# Patient Record
Sex: FEMALE | Race: WHITE | NOT HISPANIC OR LATINO | Employment: PART TIME | URBAN - METROPOLITAN AREA
[De-identification: names, ages, dates, MRNs, and addresses within clinical notes are randomized per-mention and may not be internally consistent; named-entity substitution may affect disease eponyms.]

---

## 2017-04-02 ENCOUNTER — OFFICE VISIT (OUTPATIENT)
Dept: FAMILY MEDICINE | Facility: CLINIC | Age: 52
End: 2017-04-02
Payer: COMMERCIAL

## 2017-04-02 VITALS
SYSTOLIC BLOOD PRESSURE: 143 MMHG | DIASTOLIC BLOOD PRESSURE: 80 MMHG | TEMPERATURE: 98 F | OXYGEN SATURATION: 98 % | WEIGHT: 103.31 LBS | BODY MASS INDEX: 17.64 KG/M2 | HEART RATE: 65 BPM | HEIGHT: 64 IN

## 2017-04-02 DIAGNOSIS — J30.9 ALLERGIC RHINITIS, UNSPECIFIED ALLERGIC RHINITIS TRIGGER, UNSPECIFIED RHINITIS SEASONALITY: ICD-10-CM

## 2017-04-02 DIAGNOSIS — H66.91 RIGHT OTITIS MEDIA, UNSPECIFIED CHRONICITY, UNSPECIFIED OTITIS MEDIA TYPE: Primary | ICD-10-CM

## 2017-04-02 PROCEDURE — 99999 PR PBB SHADOW E&M-EST. PATIENT-LVL III: CPT | Mod: PBBFAC,,,

## 2017-04-02 PROCEDURE — 1160F RVW MEDS BY RX/DR IN RCRD: CPT | Mod: S$GLB,,, | Performed by: NURSE PRACTITIONER

## 2017-04-02 PROCEDURE — 99214 OFFICE O/P EST MOD 30 MIN: CPT | Mod: S$GLB,,, | Performed by: NURSE PRACTITIONER

## 2017-04-02 RX ORDER — FLUTICASONE PROPIONATE 50 MCG
2 SPRAY, SUSPENSION (ML) NASAL DAILY
Qty: 1 BOTTLE | Refills: 2 | Status: SHIPPED | OUTPATIENT
Start: 2017-04-02 | End: 2017-05-02

## 2017-04-02 RX ORDER — AMOXICILLIN 500 MG/1
500 CAPSULE ORAL EVERY 12 HOURS
Qty: 14 CAPSULE | Refills: 0 | Status: SHIPPED | OUTPATIENT
Start: 2017-04-02 | End: 2017-04-12

## 2017-04-02 RX ORDER — CETIRIZINE HYDROCHLORIDE 10 MG/1
10 TABLET ORAL DAILY
Refills: 0 | COMMUNITY
Start: 2017-04-02 | End: 2017-04-02 | Stop reason: SDUPTHER

## 2017-04-02 RX ORDER — CETIRIZINE HYDROCHLORIDE 10 MG/1
10 TABLET ORAL DAILY
Qty: 30 TABLET | Refills: 0 | Status: SHIPPED | OUTPATIENT
Start: 2017-04-02 | End: 2018-02-05

## 2017-04-02 NOTE — MR AVS SNAPSHOT
[facility]  [address]  [place] [address]  Phone: [phone]  Fax: [phone]                  Floridalma Campa   2017 3:30 PM   Office Visit    Description:  Female : 1965   Provider:  LAPALCO, WALK IN   Department:  [facility]           Reason for Visit     Otalgia     Sore Throat           Diagnoses this Visit        Comments    Right otitis media, unspecified chronicity, unspecified otitis media type    -  Primary     Allergic rhinitis, unspecified allergic rhinitis trigger, unspecified rhinitis seasonality                To Do List           Goals (5 Years of Data)     None       These Medications        Disp Refills Start End    fluticasone (FLONASE) 50 mcg/actuation nasal spray 1 Bottle 2 2017    2 sprays by Each Nare route once daily. - Each Nare    Pharmacy: [facility] [id number] [place] [address] Ph #: [phone]       amoxicillin (AMOXIL) 500 MG capsule 14 capsule 0 2017    Take 1 capsule (500 mg total) by mouth every 12 (twelve) hours. - Oral    Pharmacy: [facility] [id number] [place] [address] Ph #: [phone]         PURCHASE these Medications (No prescription required)        Start End    cetirizine (ZYRTEC) 10 MG tablet 2017    Sig - Route: Take 1 tablet (10 mg total) by mouth once daily. - Oral    Class: OTC      [facility]     [facility] Nurse Care Line - 24/ Assistance  Unless otherwise directed by your provider, please contact [facility], our nurse care line that is available for 24/7 assistance.     Registered nurses in the [facility] Center provide: appointment scheduling, clinical advisement, health education, and other advisory services.  Call: [phone] (toll free)               Medications           Message regarding Medications     Verify the changes and/or additions to your medication regime listed below are the same as  "discussed with your clinician today.  If any of these changes or additions are incorrect, please notify your healthcare provider.        START taking these NEW medications        Refills    fluticasone (FLONASE) 50 mcg/actuation nasal spray 2    Si sprays by Each Nare route once daily.    Class: Normal    Route: Each Nare    cetirizine (ZYRTEC) 10 MG tablet 0    Sig: Take 1 tablet (10 mg total) by mouth once daily.    Class: OTC    Route: Oral    amoxicillin (AMOXIL) 500 MG capsule 0    Sig: Take 1 capsule (500 mg total) by mouth every 12 (twelve) hours.    Class: Normal    Route: Oral      STOP taking these medications     ibuprofen (ADVIL,MOTRIN) 800 MG tablet     naproxen (NAPROSYN) 500 MG tablet Take 1 tablet (500 mg total) by mouth 2 (two) times daily.           Verify that the below list of medications is an accurate representation of the medications you are currently taking.  If none reported, the list may be blank. If incorrect, please contact your healthcare provider. Carry this list with you in case of emergency.           Current Medications     spironolactone (ALDACTONE) 25 MG tablet Take 25 mg by mouth once daily.    spironolactone (ALDACTONE) 50 MG tablet 50 mg.    amoxicillin (AMOXIL) 500 MG capsule Take 1 capsule (500 mg total) by mouth every 12 (twelve) hours.    cetirizine (ZYRTEC) 10 MG tablet Take 1 tablet (10 mg total) by mouth once daily.    fluticasone (FLONASE) 50 mcg/actuation nasal spray 2 sprays by Each Nare route once daily.           Clinical Reference Information           Your Vitals Were     BP Pulse Temp Height Weight SpO2    143/80 (BP Location: Right arm, Patient Position: Sitting) 65 97.9 °F (36.6 °C) (Oral) 5' 4" (1.626 m) 46.9 kg (103 lb 4.6 oz) 98%    BMI                17.73 kg/m2          Blood Pressure          Most Recent Value    BP  (!)  143/80      Allergies as of 2017     Sulfa (Sulfonamide Antibiotics)      Immunizations Administered on Date of Encounter - " 4/2/2017     None      [facility] Sign-Up     Activating your [facility] account is as easy as 1-2-3!     1) Visit [email or web address], select Sign Up Now, enter this activation code and your date of birth, then select Next.  [email or web address]  Expires: 5/17/2017  3:53 PM      2) Create a username and password to use when you visit [facility] in the future and select a security question in case you lose your password and select Next.    3) Enter your e-mail address and click Sign Up!    Additional Information  If you have questions, please e-mail [email or web address] or call [phone] to talk to our [facility] staff. Remember, [facility] is NOT to be used for urgent needs. For medical emergencies, dial 911.         Instructions    Follow up if not improved  Go to ER for new worse or concerning symptoms  Drink plenty of fluids  Tylenol or ibuprofen as needed for fever or pain    Understanding Nasal Allergies  Nasal allergies (also called allergic rhinitis) are a common health problem. They may be seasonal. This means they cause symptoms only at certain times of the year. Or they may be perennial. This means they cause symptoms all year long. Other health problems, such as asthma, often occur along with allergies as well.    What is an allergic reaction?  An allergy is a reaction to a substance called an allergen. Common allergens include:  · Wind-borne pollen  · Mold  · Dust mites  · Furry and feathered animals  · Cockroaches  Normally, allergens are harmless. But when a person has allergies, the body thinks they are harmful. The body then attacks allergens with antibodies. Antibodies are attached to special cells called mast cells. Allergens stick to the antibodies. This makes the mast cells release histamine and other chemicals. This is an allergic reaction. The chemicals irritate nearby nasal tissue. This causes nasal allergy symptoms.  Common nasal allergy symptoms  Allergies can cause nasal tissue to swell. This makes  the air passages smaller. The nose may feel stuffed up. The nose may also make extra mucus, which can plug the nasal passages or drip out of the nose. Mucus can drip down the back of the throat (postnasal drip) as well. Sinus tissue can swell. This may cause pain and headache. Common allergy symptoms include:  · Runny nose with clear, watery discharge  · Stuffy nose (nasal congestion)  · Drainage down your throat (postnasal drip)  · Sneezing  · Red, watery eyes  · Itchy nose, eyes, ears, and throat  · Plugged-up ears (ear congestion)  · Sore throat  · Coughing  · Sinus pain and swelling  · Headache  It may not be allergies  Other health problems can cause symptoms like those of nasal allergies. These include:  · Nonallergic rhinitis and viruses such as colds  · Irritants and pollutants, such as strong odors or smoke  · Certain medicines  · Changes in the weather   Treatment  Your healthcare provider will evaluate you to find the cause of your symptoms then recommend treatment. If your symptoms are due to nasal allergies, your healthcare provider may prescribe nasal steroid sprays or oral antihistamines to help reduce symptoms. Avoidance of the allergen will also be suggested. You may also be referred to an allergist.   Date Last Reviewed: 10/1/2016  © 2793-5733 [employer]. [address] [place] [address]. All rights reserved. This information is not intended as a substitute for professional medical care. Always follow your healthcare professional's instructions.             Language Assistance Services     ATTENTION: Language assistance services are available, free of charge. Please call [phone].      ATENCIÓN: Si habla jason, tiene a curran disposición servicios gratuitos de asistencia lingüística. Llame al [phone].     [facility] Ý: N?u b?n nói Ti?ng Vi?t, có các d?ch v? h? tr? ngôn ng? mi?n phí dành cho b?n. G?i s? [phone].         [facility] complies with  applicable Federal civil rights laws and does not discriminate on the basis of race, color, national origin, age, disability, or sex.

## 2017-04-02 NOTE — PATIENT INSTRUCTIONS
Follow up if not improved  Go to ER for new worse or concerning symptoms  Drink plenty of fluids  Tylenol or ibuprofen as needed for fever or pain    Understanding Nasal Allergies  Nasal allergies (also called allergic rhinitis) are a common health problem. They may be seasonal. This means they cause symptoms only at certain times of the year. Or they may be perennial. This means they cause symptoms all year long. Other health problems, such as asthma, often occur along with allergies as well.    What is an allergic reaction?  An allergy is a reaction to a substance called an allergen. Common allergens include:  · Wind-borne pollen  · Mold  · Dust mites  · Furry and feathered animals  · Cockroaches  Normally, allergens are harmless. But when a person has allergies, the body thinks they are harmful. The body then attacks allergens with antibodies. Antibodies are attached to special cells called mast cells. Allergens stick to the antibodies. This makes the mast cells release histamine and other chemicals. This is an allergic reaction. The chemicals irritate nearby nasal tissue. This causes nasal allergy symptoms.  Common nasal allergy symptoms  Allergies can cause nasal tissue to swell. This makes the air passages smaller. The nose may feel stuffed up. The nose may also make extra mucus, which can plug the nasal passages or drip out of the nose. Mucus can drip down the back of the throat (postnasal drip) as well. Sinus tissue can swell. This may cause pain and headache. Common allergy symptoms include:  · Runny nose with clear, watery discharge  · Stuffy nose (nasal congestion)  · Drainage down your throat (postnasal drip)  · Sneezing  · Red, watery eyes  · Itchy nose, eyes, ears, and throat  · Plugged-up ears (ear congestion)  · Sore throat  · Coughing  · Sinus pain and swelling  · Headache  It may not be allergies  Other health problems can cause symptoms like those of nasal allergies. These include:  · Nonallergic  rhinitis and viruses such as colds  · Irritants and pollutants, such as strong odors or smoke  · Certain medicines  · Changes in the weather   Treatment  Your healthcare provider will evaluate you to find the cause of your symptoms then recommend treatment. If your symptoms are due to nasal allergies, your healthcare provider may prescribe nasal steroid sprays or oral antihistamines to help reduce symptoms. Avoidance of the allergen will also be suggested. You may also be referred to an allergist.   Date Last Reviewed: 10/1/2016  © 8622-0204 [employer]. [address] [place] [address], [place] [address]. All rights reserved. This information is not intended as a substitute for professional medical care. Always follow your healthcare professional's instructions.

## 2017-04-02 NOTE — PROGRESS NOTES
Upper Respiratory Infection  Patient complains of a 1month intermittent history of some URI complaints. Associated symptoms include R ear pain, throat pain, NP cough, no fever .  She has attempted cefdir tablets.  Sick contacts include .  She has had a flu shot this season.    Subjective:       Patient ID: Floridalma Campa is a 51 y.o. female.      Review of Systems   Constitutional: Negative for fever.   HENT: Positive for ear pain and sore throat.    Respiratory: Positive for cough.        Objective:      Physical Exam   Constitutional: She is oriented to person, place, and time. She appears well-developed and well-nourished. She does not appear ill. No distress.   HENT:   Right Ear: Tympanic membrane is injected. A middle ear effusion is present.   Left Ear: A middle ear effusion is present.   Mouth/Throat: Uvula is midline, oropharynx is clear and moist and mucous membranes are normal.   Cardiovascular: Normal rate, regular rhythm and normal heart sounds.  Exam reveals no friction rub.    No murmur heard.  Pulmonary/Chest: Effort normal and breath sounds normal. No respiratory distress. She has no decreased breath sounds. She has no wheezes. She has no rhonchi. She has no rales.   Musculoskeletal: Normal range of motion. She exhibits no edema.   Lymphadenopathy:        Head (right side): No submental, no submandibular and no tonsillar adenopathy present.        Head (left side): No submental, no submandibular and no tonsillar adenopathy present.     She has no cervical adenopathy.   Neurological: She is alert and oriented to person, place, and time.   Skin: Skin is warm and dry. No erythema.   Psychiatric: She has a normal mood and affect. Her behavior is normal.   Vitals reviewed.      Assessment:       1. Right otitis media, unspecified chronicity, unspecified otitis media type    2. Allergic rhinitis, unspecified allergic rhinitis trigger, unspecified rhinitis seasonality        Plan:       Right otitis  media, unspecified chronicity, unspecified otitis media type  -     amoxicillin (AMOXIL) 500 MG capsule; Take 1 capsule (500 mg total) by mouth every 12 (twelve) hours.  Dispense: 14 capsule; Refill: 0    Allergic rhinitis, unspecified allergic rhinitis trigger, unspecified rhinitis seasonality  -     fluticasone (FLONASE) 50 mcg/actuation nasal spray; 2 sprays by Each Nare route once daily.  Dispense: 1 Bottle; Refill: 2  -     cetirizine (ZYRTEC) 10 MG tablet; Take 1 tablet (10 mg total) by mouth once daily.  Dispense: 30 tablet; Refill: 0    Viral vs bacterial, will treat with abx given chronicity.    Follow up if not improved  Go to ER for new worse or concerning symptoms  Drink plenty of fluids  Tylenol or ibuprofen as needed for fever or pain    Verbalized understanding

## 2017-12-26 ENCOUNTER — OFFICE VISIT (OUTPATIENT)
Dept: FAMILY MEDICINE | Facility: CLINIC | Age: 52
End: 2017-12-26
Payer: COMMERCIAL

## 2017-12-26 VITALS
TEMPERATURE: 98 F | SYSTOLIC BLOOD PRESSURE: 120 MMHG | OXYGEN SATURATION: 98 % | HEIGHT: 64 IN | BODY MASS INDEX: 18.5 KG/M2 | WEIGHT: 108.38 LBS | HEART RATE: 78 BPM | DIASTOLIC BLOOD PRESSURE: 64 MMHG

## 2017-12-26 DIAGNOSIS — R09.82 POST-NASAL DRIP: ICD-10-CM

## 2017-12-26 DIAGNOSIS — R05.9 COUGH: ICD-10-CM

## 2017-12-26 DIAGNOSIS — J01.90 ACUTE RHINOSINUSITIS: Primary | ICD-10-CM

## 2017-12-26 PROCEDURE — 99214 OFFICE O/P EST MOD 30 MIN: CPT | Mod: S$GLB,,, | Performed by: NURSE PRACTITIONER

## 2017-12-26 PROCEDURE — 99999 PR PBB SHADOW E&M-EST. PATIENT-LVL IV: CPT | Mod: PBBFAC,,, | Performed by: NURSE PRACTITIONER

## 2017-12-26 RX ORDER — LEVOCETIRIZINE DIHYDROCHLORIDE 5 MG/1
5 TABLET, FILM COATED ORAL NIGHTLY
Qty: 30 TABLET | Refills: 0 | Status: SHIPPED | OUTPATIENT
Start: 2017-12-26 | End: 2018-02-05 | Stop reason: SDUPTHER

## 2017-12-26 RX ORDER — PROMETHAZINE HYDROCHLORIDE AND DEXTROMETHORPHAN HYDROBROMIDE 6.25; 15 MG/5ML; MG/5ML
5 SYRUP ORAL NIGHTLY
Qty: 180 ML | Refills: 0 | Status: SHIPPED | OUTPATIENT
Start: 2017-12-26 | End: 2018-01-05

## 2017-12-26 RX ORDER — BENZONATATE 100 MG/1
100 CAPSULE ORAL 3 TIMES DAILY PRN
Qty: 30 CAPSULE | Refills: 0 | Status: SHIPPED | OUTPATIENT
Start: 2017-12-26 | End: 2018-01-05

## 2017-12-26 RX ORDER — FLUTICASONE PROPIONATE 50 MCG
1 SPRAY, SUSPENSION (ML) NASAL DAILY
Qty: 1 BOTTLE | Refills: 0 | Status: SHIPPED | OUTPATIENT
Start: 2017-12-26

## 2017-12-26 NOTE — PROGRESS NOTES
"History of Present Illness   Floridalma Campa is a 52 y.o. woman with medical history as listed below who presents today for evaluation of URI symptoms for about four days. She complains of nasal congestion with post nasal drip, sore throat, and a dry cough. She also has bilateral ear pain. She has intermittent fevers with chills. She denies sinus pressure or headaches. She has tried Tylenol, Ibuprofen, and Zyrtec with no relief. She denies known sick contacts. She has no additional complaints and is otherwise healthy on today's visit.    Past Medical History:   Diagnosis Date    Bloating        Past Surgical History:   Procedure Laterality Date    COSMETIC SURGERY  1990,2005;2007       Social History     Social History    Marital status:      Spouse name: N/A    Number of children: N/A    Years of education: N/A     Social History Main Topics    Smoking status: Never Smoker    Smokeless tobacco: None    Alcohol use Yes      Comment: daily wine, and liquor    Drug use: No    Sexual activity: Not Asked     Other Topics Concern    None     Social History Narrative    None       Family History   Problem Relation Age of Onset    Hypertension Mother     Heart disease Mother     Hypertension Father     Heart disease Father        Review of Systems  Review of Systems   Constitutional: Positive for chills, fever and malaise/fatigue.   HENT: Positive for congestion, ear pain and sore throat. Negative for ear discharge and sinus pain.    Eyes: Negative for discharge and redness.   Respiratory: Positive for cough. Negative for sputum production, shortness of breath and wheezing.    Cardiovascular: Negative for chest pain.   Gastrointestinal: Negative for nausea and vomiting.     A complete review of systems was otherwise negative.    Physical Exam  /64 (BP Location: Right arm, Patient Position: Sitting, BP Method: Medium (Manual))   Pulse 78   Temp 97.9 °F (36.6 °C) (Oral)   Ht 5' 4" (1.626 m)   " Wt 49.2 kg (108 lb 5.7 oz)   SpO2 98%   BMI 18.60 kg/m²   General appearance: alert, appears stated age, cooperative and no distress  Eyes: negative findings: lids and lashes normal and conjunctivae and sclerae normal  Ears: normal TM's and external ear canals both ears and bilateral middle ear effusion  Nose: clear discharge, moderate congestion, turbinates red, swollen, no sinus tenderness  Throat: lips, mucosa, and tongue normal; teeth and gums normal and clear post nasal drainage  Lungs: clear to auscultation bilaterally  Heart: regular rate and rhythm, S1, S2 normal, no murmur, click, rub or gallop  Lymph nodes: Cervical, supraclavicular, and axillary nodes normal.  Neurologic: Grossly normal    Assessment/Plan  Acute rhinosinusitis  Viral, antibiotics not indicated.  Xyzal and Flonase daily with Tessalon and nighttime cough syrup PRN.  Tylenol and Ibuprofen for fever and aches.  Rest and increase fluid intake.  RTC PRN for persistent or worsening symptoms.  -     levocetirizine (XYZAL) 5 MG tablet; Take 1 tablet (5 mg total) by mouth every evening.  Dispense: 30 tablet; Refill: 0  -     fluticasone (FLONASE) 50 mcg/actuation nasal spray; 1 spray by Each Nare route once daily.  Dispense: 1 Bottle; Refill: 0  -     benzonatate (TESSALON) 100 MG capsule; Take 1 capsule (100 mg total) by mouth 3 (three) times daily as needed.  Dispense: 30 capsule; Refill: 0  -     promethazine-dextromethorphan (PROMETHAZINE-DM) 6.25-15 mg/5 mL Syrp; Take 5 mLs by mouth every evening.  Dispense: 180 mL; Refill: 0    Cough  As above.  -     levocetirizine (XYZAL) 5 MG tablet; Take 1 tablet (5 mg total) by mouth every evening.  Dispense: 30 tablet; Refill: 0  -     fluticasone (FLONASE) 50 mcg/actuation nasal spray; 1 spray by Each Nare route once daily.  Dispense: 1 Bottle; Refill: 0  -     benzonatate (TESSALON) 100 MG capsule; Take 1 capsule (100 mg total) by mouth 3 (three) times daily as needed.  Dispense: 30 capsule; Refill:  0  -     promethazine-dextromethorphan (PROMETHAZINE-DM) 6.25-15 mg/5 mL Syrp; Take 5 mLs by mouth every evening.  Dispense: 180 mL; Refill: 0    Post-nasal drip  As above.  -     levocetirizine (XYZAL) 5 MG tablet; Take 1 tablet (5 mg total) by mouth every evening.  Dispense: 30 tablet; Refill: 0  -     fluticasone (FLONASE) 50 mcg/actuation nasal spray; 1 spray by Each Nare route once daily.  Dispense: 1 Bottle; Refill: 0  -     benzonatate (TESSALON) 100 MG capsule; Take 1 capsule (100 mg total) by mouth 3 (three) times daily as needed.  Dispense: 30 capsule; Refill: 0  -     promethazine-dextromethorphan (PROMETHAZINE-DM) 6.25-15 mg/5 mL Syrp; Take 5 mLs by mouth every evening.  Dispense: 180 mL; Refill: 0    She has verbalized understanding and is in agreement with plan of care.    Return if symptoms worsen or fail to improve.

## 2017-12-26 NOTE — PATIENT INSTRUCTIONS

## 2018-01-08 ENCOUNTER — PATIENT MESSAGE (OUTPATIENT)
Dept: FAMILY MEDICINE | Facility: CLINIC | Age: 53
End: 2018-01-08

## 2018-01-08 ENCOUNTER — OFFICE VISIT (OUTPATIENT)
Dept: FAMILY MEDICINE | Facility: CLINIC | Age: 53
End: 2018-01-08
Payer: COMMERCIAL

## 2018-01-08 ENCOUNTER — HOSPITAL ENCOUNTER (OUTPATIENT)
Dept: RADIOLOGY | Facility: HOSPITAL | Age: 53
Discharge: HOME | End: 2018-01-08
Attending: NURSE PRACTITIONER
Payer: COMMERCIAL

## 2018-01-08 VITALS
HEART RATE: 89 BPM | SYSTOLIC BLOOD PRESSURE: 112 MMHG | OXYGEN SATURATION: 97 % | HEIGHT: 64 IN | TEMPERATURE: 98 F | DIASTOLIC BLOOD PRESSURE: 72 MMHG | WEIGHT: 106.06 LBS | BODY MASS INDEX: 18.11 KG/M2

## 2018-01-08 DIAGNOSIS — J18.9 PRIMARY ATYPICAL PNEUMONIA: ICD-10-CM

## 2018-01-08 DIAGNOSIS — J18.9 PRIMARY ATYPICAL PNEUMONIA: Primary | ICD-10-CM

## 2018-01-08 PROCEDURE — 99999 PR PBB SHADOW E&M-EST. PATIENT-LVL IV: CPT | Mod: PBBFAC,,, | Performed by: NURSE PRACTITIONER

## 2018-01-08 PROCEDURE — 71046 X-RAY EXAM CHEST 2 VIEWS: CPT | Mod: TC,FY,PO

## 2018-01-08 PROCEDURE — 99214 OFFICE O/P EST MOD 30 MIN: CPT | Mod: S$GLB,,, | Performed by: NURSE PRACTITIONER

## 2018-01-08 PROCEDURE — 71046 X-RAY EXAM CHEST 2 VIEWS: CPT | Mod: 26,,, | Performed by: RADIOLOGY

## 2018-01-08 RX ORDER — AZITHROMYCIN 250 MG/1
TABLET, FILM COATED ORAL
Qty: 6 TABLET | Refills: 0 | Status: SHIPPED | OUTPATIENT
Start: 2018-01-08 | End: 2018-01-13

## 2018-01-08 NOTE — PROGRESS NOTES
"History of Present Illness   Floridalma Campa is a 52 y.o. woman with medical history as listed below who presents today for evaluation of URI symptoms. She was seen by myself on 12/26 for rhinosinusitis. She completed Xyzal, Tessalon, Flonase, and Promethazine-DM. She initially felt better, but she is now having worsening of symptoms. She has a productive cough and pain to mid back area with cough. She has also noticed some wheezing. She states, "I feel like something is in my lungs." She has had fevers, chills, and body aches. She overall feels drained. She has no additional complaints and is otherwise healthy on today's visit.       Past Medical History:   Diagnosis Date    Bloating        Past Surgical History:   Procedure Laterality Date    COSMETIC SURGERY  1990,2005;2007       Social History     Social History    Marital status:      Spouse name: N/A    Number of children: N/A    Years of education: N/A     Social History Main Topics    Smoking status: Never Smoker    Smokeless tobacco: None    Alcohol use Yes      Comment: daily wine, and liquor    Drug use: No    Sexual activity: Not Asked     Other Topics Concern    None     Social History Narrative    None       Family History   Problem Relation Age of Onset    Hypertension Mother     Heart disease Mother     Hypertension Father     Heart disease Father        Review of Systems  Review of Systems   Constitutional: Positive for chills, fever and malaise/fatigue.   HENT: Negative for congestion, ear pain, sinus pain and sore throat.    Respiratory: Positive for cough, sputum production and wheezing. Negative for shortness of breath.    Cardiovascular: Negative for chest pain and palpitations.     A complete review of systems was otherwise negative.    Physical Exam  /72 (BP Location: Right arm, Patient Position: Sitting, BP Method: Medium (Manual))   Pulse 89   Temp 97.8 °F (36.6 °C) (Oral)   Ht 5' 4" (1.626 m)   Wt 48.1 kg " (106 lb 0.7 oz)   LMP 08/01/2015   SpO2 97%   BMI 18.20 kg/m²   General appearance: alert, appears stated age, cooperative, no distress and ill appearing  Eyes: negative findings: lids and lashes normal and conjunctivae and sclerae normal  Ears: normal TM's and external ear canals both ears  Nose: Nares normal. Septum midline. Mucosa normal. No drainage or sinus tenderness.  Throat: lips, mucosa, and tongue normal; teeth and gums normal  Lungs: bibasilar wheezing with diminished breath sounds, normal effort and rate, no accessory muscle use  Heart: regular rate and rhythm, S1, S2 normal, no murmur, click, rub or gallop  Lymph nodes: Cervical, supraclavicular, and axillary nodes normal.  Neurologic: Grossly normal    Assessment/Plan  Primary atypical pneumonia  Chest x-ray.  Start Azithromycin, take as directed until complete.  Continue cough medication as needed.  Tylenol/Ibuprofen for fever and aches.  Rest and increase fluid intake.  -     X-Ray Chest PA And Lateral; Future; Expected date: 01/08/2018  -     azithromycin (Z-ADALID) 250 MG tablet; Take 2 tablets by mouth on day 1; Take 1 tablet by mouth on days 2-5  Dispense: 6 tablet; Refill: 0    She has verbalized understanding and is in agreement with plan of care.    Return if symptoms worsen or fail to improve.

## 2018-01-08 NOTE — PATIENT INSTRUCTIONS
Pneumonia (Adult)  Pneumonia is an infection deep within the lungs. It is in the small air sacs (alveoli). Pneumonia may be caused by a virus or bacteria. Pneumonia caused by bacteria is usually treated with an antibiotic. Severe cases may need to be treated in the hospital. Milder cases can be treated at home. Symptoms usually start to get better during the first 2 days of treatment.    Home care  Follow these guidelines when caring for yourself at home:  · Rest at home for the first 2 to 3 days, or until you feel stronger. Dont let yourself get overly tired when you go back to your activities.  · Stay away from cigarette smoke - yours or other peoples.  · You may use acetaminophen or ibuprofen to control fever or pain, unless another medicine was prescribed. If you have chronic liver or kidney disease, talk with your healthcare provider before using these medicines. Also talk with your provider if youve had a stomach ulcer or gastrointestinal bleeding. Dont give aspirin to anyone younger than 18 years of age who is ill with a fever. It may cause severe liver damage.  · Your appetite may be poor, so a light diet is fine.  · Drink 6 to 8 glasses of fluids every day to make sure you are getting enough fluids. Beverages can include water, sport drinks, sodas without caffeine, juices, tea, or soup. Fluids will help loosen secretions in the lung. This will make it easier for you to cough up the phlegm (sputum). If you also have heart or kidney disease, check with your healthcare provider before you drink extra fluids.  · Take antibiotic medicine prescribed until it is all gone, even if you are feeling better after a few days.  Follow-up care  Follow up with your healthcare provider in the next 2 to 3 days, or as advised. This is to be sure the medicine is helping you get better.  If you are 65 or older, you should get a pneumococcal vaccine and a yearly flu (influenza) shot. You should also get these vaccines if  you have chronic lung disease like asthma, emphysema, or COPD. Recently, a second type of pneumonia vaccine has become available for everyone over 65 years old. This is in addition to the previous vaccine. Ask your provider about this.  When to seek medical advice  Call your healthcare provider right away if any of these occur:  · You dont get better within the first 48 hours of treatment  · Shortness of breath gets worse  · Rapid breathing (more than 25 breaths per minute)  · Coughing up blood  · Chest pain gets worse with breathing  · Fever of 100.4°F (38°C) or higher that doesnt get better with fever medicine  · Weakness, dizziness, or fainting that gets worse  · Thirst or dry mouth that gets worse  · Sinus pain, headache, or a stiff neck  · Chest pain not caused by coughing  Date Last Reviewed: 1/1/2017  © 1399-3450 [employer]. [address] [place] [address]. All rights reserved. This information is not intended as a substitute for professional medical care. Always follow your healthcare professional's instructions.

## 2018-02-05 DIAGNOSIS — R09.82 POST-NASAL DRIP: ICD-10-CM

## 2018-02-05 DIAGNOSIS — R05.9 COUGH: ICD-10-CM

## 2018-02-05 DIAGNOSIS — J01.90 ACUTE RHINOSINUSITIS: ICD-10-CM

## 2018-02-05 RX ORDER — LEVOCETIRIZINE DIHYDROCHLORIDE 5 MG/1
5 TABLET, FILM COATED ORAL NIGHTLY
Qty: 30 TABLET | Refills: 0 | Status: SHIPPED | OUTPATIENT
Start: 2018-02-05 | End: 2018-03-07 | Stop reason: SDUPTHER

## 2018-03-07 DIAGNOSIS — R09.82 POST-NASAL DRIP: ICD-10-CM

## 2018-03-07 DIAGNOSIS — J01.90 ACUTE RHINOSINUSITIS: ICD-10-CM

## 2018-03-07 DIAGNOSIS — R05.9 COUGH: ICD-10-CM

## 2018-03-07 RX ORDER — LEVOCETIRIZINE DIHYDROCHLORIDE 5 MG/1
5 TABLET, FILM COATED ORAL NIGHTLY
Qty: 30 TABLET | Refills: 0 | Status: SHIPPED | OUTPATIENT
Start: 2018-03-07 | End: 2019-03-07

## 2019-01-01 ENCOUNTER — HOSPITAL ENCOUNTER (EMERGENCY)
Facility: HOSPITAL | Age: 54
Discharge: HOME | End: 2019-01-01
Attending: EMERGENCY MEDICINE
Payer: COMMERCIAL

## 2019-01-01 VITALS
HEART RATE: 61 BPM | OXYGEN SATURATION: 98 % | HEIGHT: 64 IN | WEIGHT: 104 LBS | SYSTOLIC BLOOD PRESSURE: 142 MMHG | BODY MASS INDEX: 17.75 KG/M2 | RESPIRATION RATE: 17 BRPM | TEMPERATURE: 98 F | DIASTOLIC BLOOD PRESSURE: 79 MMHG

## 2019-01-01 DIAGNOSIS — R07.81 RIB PAIN ON RIGHT SIDE: ICD-10-CM

## 2019-01-01 DIAGNOSIS — N39.0 ACUTE UTI: Primary | ICD-10-CM

## 2019-01-01 DIAGNOSIS — R55 SYNCOPE: ICD-10-CM

## 2019-01-01 DIAGNOSIS — R40.20 LOC (LOSS OF CONSCIOUSNESS): ICD-10-CM

## 2019-01-01 LAB
ALBUMIN SERPL-MCNC: 4.1 G/DL (ref 3.3–5.5)
ALP SERPL-CCNC: 37 U/L (ref 42–141)
BILIRUB SERPL-MCNC: 0.7 MG/DL (ref 0.2–1.6)
BILIRUBIN, POC UA: NEGATIVE
BLOOD, POC UA: NEGATIVE
BUN SERPL-MCNC: 18 MG/DL (ref 7–22)
CALCIUM SERPL-MCNC: 9.9 MG/DL (ref 8–10.3)
CHLORIDE SERPL-SCNC: 99 MMOL/L (ref 98–108)
CLARITY, POC UA: ABNORMAL
COLOR, POC UA: YELLOW
CREAT SERPL-MCNC: 1 MG/DL (ref 0.6–1.2)
GLUCOSE SERPL-MCNC: 101 MG/DL (ref 73–118)
GLUCOSE, POC UA: NEGATIVE
KETONES, POC UA: NEGATIVE
LEUKOCYTE EST, POC UA: ABNORMAL
NITRITE, POC UA: NEGATIVE
PH UR STRIP: 7.5 [PH]
POC ALT (SGPT): 16 U/L (ref 10–47)
POC AST (SGOT): 20 U/L (ref 11–38)
POC B-TYPE NATRIURETIC PEPTIDE: 14.9 PG/ML (ref 0–100)
POC CARDIAC TROPONIN I: 0 NG/ML
POC D-DI: <100 NG/ML (ref 0–450)
POC PTINR: 1 (ref 0.9–1.2)
POC PTWBT: 11.8 SEC (ref 9.7–14.3)
POC TCO2: 30 MMOL/L (ref 18–33)
POTASSIUM BLD-SCNC: 4.6 MMOL/L (ref 3.6–5.1)
PROTEIN, POC UA: NEGATIVE
PROTEIN, POC: 7.5 G/DL (ref 6.4–8.1)
SAMPLE: NORMAL
SAMPLE: NORMAL
SODIUM BLD-SCNC: 146 MMOL/L (ref 128–145)
SPECIFIC GRAVITY, POC UA: 1.01
UROBILINOGEN, POC UA: 0.2 E.U./DL

## 2019-01-01 PROCEDURE — 84484 ASSAY OF TROPONIN QUANT: CPT | Mod: ER

## 2019-01-01 PROCEDURE — 93005 ELECTROCARDIOGRAM TRACING: CPT | Mod: ER

## 2019-01-01 PROCEDURE — 83880 ASSAY OF NATRIURETIC PEPTIDE: CPT | Mod: ER

## 2019-01-01 PROCEDURE — 81003 URINALYSIS AUTO W/O SCOPE: CPT | Mod: ER

## 2019-01-01 PROCEDURE — 85025 COMPLETE CBC W/AUTO DIFF WBC: CPT | Mod: ER

## 2019-01-01 PROCEDURE — 93010 ELECTROCARDIOGRAM REPORT: CPT | Mod: 76,,, | Performed by: INTERNAL MEDICINE

## 2019-01-01 PROCEDURE — 80053 COMPREHEN METABOLIC PANEL: CPT | Mod: ER

## 2019-01-01 PROCEDURE — 93010 EKG 12-LEAD: ICD-10-PCS | Mod: 76,,, | Performed by: INTERNAL MEDICINE

## 2019-01-01 PROCEDURE — 99285 EMERGENCY DEPT VISIT HI MDM: CPT | Mod: 25,ER

## 2019-01-01 PROCEDURE — 85610 PROTHROMBIN TIME: CPT | Mod: ER

## 2019-01-01 PROCEDURE — 87086 URINE CULTURE/COLONY COUNT: CPT

## 2019-01-01 PROCEDURE — 85379 FIBRIN DEGRADATION QUANT: CPT | Mod: ER

## 2019-01-01 RX ORDER — NAPROXEN 375 MG/1
375 TABLET ORAL 2 TIMES DAILY PRN
Qty: 20 TABLET | Refills: 0 | Status: SHIPPED | OUTPATIENT
Start: 2019-01-01 | End: 2019-01-06

## 2019-01-01 RX ORDER — METHOCARBAMOL 750 MG/1
750 TABLET, FILM COATED ORAL 3 TIMES DAILY PRN
Qty: 30 TABLET | Refills: 0 | Status: SHIPPED | OUTPATIENT
Start: 2019-01-01 | End: 2019-01-06

## 2019-01-01 RX ORDER — DICLOFENAC SODIUM 10 MG/G
2 GEL TOPICAL 3 TIMES DAILY
Qty: 1 TUBE | Refills: 0 | Status: SHIPPED | OUTPATIENT
Start: 2019-01-01 | End: 2019-01-11

## 2019-01-01 RX ORDER — NITROFURANTOIN 25; 75 MG/1; MG/1
100 CAPSULE ORAL 2 TIMES DAILY
Qty: 10 CAPSULE | Refills: 0 | Status: SHIPPED | OUTPATIENT
Start: 2019-01-01 | End: 2019-01-06

## 2019-01-01 NOTE — ED PROVIDER NOTES
Encounter Date: 1/1/2019    SCRIBE #1 NOTE: I, Celine Chung, am scribing for, and in the presence of,  Dr. Zhang. I have scribed the following portions of the note - Other sections scribed: HPI, ROS, PE.       History     Chief Complaint   Patient presents with    Rib Pain     pt reports she passed out last night and only remember waking up on the floor. pt reports prior to passing out, she was dizzy and now c/o right side of rib pain.     53 y.o female presents with left sided rib pain after she felt dizzy and had a syncopal episode at 3:00am this morning when she got out of her bed letting her dog outside. Her pain worsens with movement. She thinks she experienced LOC due to waking up on the floor when she got out of bed. She reports only having 2 drinks around 6:00pm.  notes she gets light headed when doing daily activities. She did eat yesterday prior to the onset. She denies N.V, and leg swelling. No known health problems. She has a hx of basal vasovagal syncope.        The history is provided by the patient.     Review of patient's allergies indicates:   Allergen Reactions    Sulfa (sulfonamide antibiotics) Swelling     Past Medical History:   Diagnosis Date    Bloating      Past Surgical History:   Procedure Laterality Date    COSMETIC SURGERY  1990,2005;2007     Family History   Problem Relation Age of Onset    Hypertension Mother     Heart disease Mother     Hypertension Father     Heart disease Father      Social History     Tobacco Use    Smoking status: Never Smoker   Substance Use Topics    Alcohol use: Yes     Comment: daily wine, and liquor    Drug use: No     Review of Systems   Cardiovascular: Positive for chest pain (left sided rib). Negative for leg swelling.   Gastrointestinal: Negative for nausea and vomiting.   Neurological: Positive for dizziness (intermittent), syncope and light-headedness (intermittent).   All other systems reviewed and are negative.      Physical Exam      Initial Vitals [01/01/19 0902]   BP Pulse Resp Temp SpO2   (!) 141/83 69 17 98 °F (36.7 °C) 98 %      MAP       --         Physical Exam    Nursing note and vitals reviewed.  Constitutional: She appears well-developed and well-nourished. She is not diaphoretic. No distress.   HENT:   Head: Normocephalic and atraumatic.   Right Ear: External ear normal.   Left Ear: External ear normal.   Nose: Nose normal.   Eyes: EOM are normal. Pupils are equal, round, and reactive to light.   Neck: Normal range of motion. Neck supple. No JVD present.   Cardiovascular: Normal rate, regular rhythm and normal heart sounds. Exam reveals no gallop and no friction rub.    No murmur heard.  Pulmonary/Chest: Effort normal and breath sounds normal. No respiratory distress. She has no wheezes. She has no rhonchi. She has no rales. She exhibits tenderness (right lower ribs + TTP).   Abdominal: Soft. Bowel sounds are normal. She exhibits no distension and no mass. There is no tenderness. There is no rebound and no guarding.   Musculoskeletal: Normal range of motion.   Neurological: She is alert and oriented to person, place, and time. She has normal strength. No sensory deficit. GCS score is 15. GCS eye subscore is 4. GCS verbal subscore is 5. GCS motor subscore is 6.   Skin: Skin is warm and dry. Capillary refill takes less than 2 seconds.   Psychiatric: She has a normal mood and affect. Her behavior is normal.         ED Course   Procedures  Labs Reviewed   POCT URINALYSIS W/O SCOPE - Abnormal; Notable for the following components:       Result Value    Glucose, UA Negative (*)     Bilirubin, UA Negative (*)     Ketones, UA Negative (*)     Blood, UA Negative (*)     Protein, UA Negative (*)     Nitrite, UA Negative (*)     Leukocytes, UA Trace (*)     All other components within normal limits   POCT CMP - Abnormal; Notable for the following components:    Alkaline Phosphatase, POC 37 (*)     POC Sodium 146 (*)     All other components  within normal limits   POCT D DIMER - Abnormal; Notable for the following components:    POC D-DI <100 (*)     All other components within normal limits   CULTURE, URINE   TROPONIN ISTAT   POCT URINALYSIS W/O SCOPE   POCT CBC   POCT CMP   POCT PROTIME-INR   POCT TROPONIN   POCT B-TYPE NATRIURETIC PEPTIDE (BNP)   POCT D DIMER   POCT B-TYPE NATRIURETIC PEPTIDE (BNP)   ISTAT PROCEDURE     EKG Readings: (Independently Interpreted)   Initial Reading: No STEMI. Previous EKG: Compared with most recent EKG Previous EKG Date: 10/23/15. Rhythm: Sinus Bradycardia. Axis: Left Axis Deviation. Other Impression: non specific t wave abnormality QTc: 432   When compared to prior EKG rate increased by 16 bpm today       Imaging Results          CT Head Without Contrast (Final result)  Result time 01/01/19 10:12:41    Final result by Jermian Vega MD (01/01/19 10:12:41)                 Impression:      No acute abnormality.      Electronically signed by: Jermain Vega MD  Date:    01/01/2019  Time:    10:12             Narrative:    EXAMINATION:  CT HEAD WITHOUT CONTRAST    CLINICAL HISTORY:  Syncope/fainting;syncope;    TECHNIQUE:  Low dose axial CT images obtained throughout the head without intravenous contrast. Sagittal and coronal reconstructions were performed.    COMPARISON:  None.    FINDINGS:  Intracranial compartment:    Ventricles and sulci are normal in size for age without evidence of hydrocephalus. No extra-axial blood or fluid collections.    The brain parenchyma appears normal. No parenchymal mass, hemorrhage, edema or major vascular distribution infarct.    Skull/extracranial contents (limited evaluation): No fracture. Mastoid air cells and paranasal sinuses are essentially clear.                               X-Ray Chest PA And Lateral (Final result)  Result time 01/01/19 10:05:43    Final result by Noemí Rock MD (01/01/19 10:05:43)                 Impression:      Normal exam.      Electronically  signed by: Noemí Rock MD  Date:    01/01/2019  Time:    10:05             Narrative:    EXAMINATION:  XR CHEST PA AND LATERAL    CLINICAL HISTORY:  Syncope;    TECHNIQUE:  PA and lateral views of the chest were performed.    COMPARISON:  01/08/2018    FINDINGS:  The lungs are clear, with normal appearance of pulmonary vasculature and no pleural effusion or pneumothorax.    The cardiac silhouette is normal in size. The hilar and mediastinal contours are unremarkable.    The osseous and soft tissue structures are normal.                               X-Ray Cervical Spine 2 or 3 Views (Final result)  Result time 01/01/19 10:05:21    Final result by Noemí Rock MD (01/01/19 10:05:21)                 Impression:      Multilevel degenerative disc disease through the cervical spine with no acute findings.    The loss of the normal cervical lordosis can be seen in muscle spasm.      Electronically signed by: Noemí Rock MD  Date:    01/01/2019  Time:    10:05             Narrative:    EXAMINATION:  XR CERVICAL SPINE 2 OR 3 VIEWS    CLINICAL HISTORY:  fall;    TECHNIQUE:  AP, lateral and odontoid views of the cervical spine were performed.    COMPARISON:  None.    FINDINGS:  There loss of the normal cervical lordosis.  The vertebral bodies maintain normal height without fracture or subluxation.  There is loss of disc height from C4 through C7.  This is most advanced at C5-C6 and C6-C7.  No significant uncovertebral joint hypertrophy.  The prevertebral soft tissues, airway, odontoid view and visualized lung apices are normal.                                 Medical Decision Making:   Clinical Tests:   Medical Tests: Ordered and Reviewed    Chief complaint:  Syncope  Treatment in the ED Physical Exam, EKG, CBC, CMP, troponin, chest and neck x-ray, and CT head.    Discussed labs, and imaging results.  Orthostatics normal today  Troponin 0.00.  INR 1.0.  D-dimer less than 100, BNP 14.9.  CMP is 01/01/2046,  potassium 4.6, chloride 380 bicarb 30, chloride 99, glucose 101, BUN 18 and creatinine 1.0.  UA trace leukocytes.  CBC white blood cell count 6.1, hemoglobin 14.8, hematocrit 45.2 and platelets 236    Fill and take prescriptions as directed.  Return to the ED if symptoms worsen or do not resolve.   Answered questions and discussed discharge plan.    Patient feels much better and is ready for discharge.  Follow up with PCP in 1 days.            Scribe Attestation:   Scribe #1: I performed the above scribed service and the documentation accurately describes the services I performed. I attest to the accuracy of the note.     I, Dr. Herminia Zhang, personally performed the services described in this documentation. This document was produced by a scribe under my direction and in my presence. All medical record entries made by the scribe were at my direction and in my presence.  I have reviewed the chart and agree that the record reflects my personal performance and is accurate and complete. Herminia Zhang DO.     01/01/2019 10:23 AM             Clinical Impression:     1. Acute UTI    2. LOC (loss of consciousness)    3. Syncope    4. Rib pain on right side                               Herminia Zhang DO  01/01/19 1031

## 2019-01-03 LAB — BACTERIA UR CULT: NORMAL

## 2020-06-18 ENCOUNTER — LAB VISIT (OUTPATIENT)
Dept: LAB | Facility: HOSPITAL | Age: 55
End: 2020-06-18
Payer: COMMERCIAL

## 2020-06-18 ENCOUNTER — OFFICE VISIT (OUTPATIENT)
Dept: FAMILY MEDICINE | Facility: CLINIC | Age: 55
End: 2020-06-18
Payer: COMMERCIAL

## 2020-06-18 VITALS
WEIGHT: 108 LBS | SYSTOLIC BLOOD PRESSURE: 110 MMHG | OXYGEN SATURATION: 98 % | DIASTOLIC BLOOD PRESSURE: 84 MMHG | TEMPERATURE: 98 F | HEART RATE: 89 BPM | BODY MASS INDEX: 18.44 KG/M2 | HEIGHT: 64 IN

## 2020-06-18 DIAGNOSIS — Z00.00 ROUTINE MEDICAL EXAM: ICD-10-CM

## 2020-06-18 DIAGNOSIS — R55 VASOVAGAL SYNCOPE: ICD-10-CM

## 2020-06-18 DIAGNOSIS — Z11.59 NEED FOR HEPATITIS C SCREENING TEST: ICD-10-CM

## 2020-06-18 DIAGNOSIS — Z11.4 ENCOUNTER FOR SCREENING FOR HIV: ICD-10-CM

## 2020-06-18 DIAGNOSIS — Z23 NEED FOR TDAP VACCINATION: ICD-10-CM

## 2020-06-18 DIAGNOSIS — Z00.00 ROUTINE MEDICAL EXAM: Primary | ICD-10-CM

## 2020-06-18 DIAGNOSIS — Z12.11 SCREENING FOR MALIGNANT NEOPLASM OF COLON: ICD-10-CM

## 2020-06-18 LAB
ALBUMIN SERPL BCP-MCNC: 4.5 G/DL (ref 3.5–5.2)
ALP SERPL-CCNC: 43 U/L (ref 55–135)
ALT SERPL W/O P-5'-P-CCNC: 15 U/L (ref 10–44)
ANION GAP SERPL CALC-SCNC: 10 MMOL/L (ref 8–16)
AST SERPL-CCNC: 18 U/L (ref 10–40)
BASOPHILS # BLD AUTO: 0.05 K/UL (ref 0–0.2)
BASOPHILS NFR BLD: 1.3 % (ref 0–1.9)
BILIRUB SERPL-MCNC: 0.5 MG/DL (ref 0.1–1)
BUN SERPL-MCNC: 17 MG/DL (ref 6–20)
CALCIUM SERPL-MCNC: 10.1 MG/DL (ref 8.7–10.5)
CHLORIDE SERPL-SCNC: 103 MMOL/L (ref 95–110)
CHOLEST SERPL-MCNC: 282 MG/DL (ref 120–199)
CHOLEST/HDLC SERPL: 2.1 {RATIO} (ref 2–5)
CO2 SERPL-SCNC: 27 MMOL/L (ref 23–29)
CREAT SERPL-MCNC: 0.8 MG/DL (ref 0.5–1.4)
DIFFERENTIAL METHOD: ABNORMAL
EOSINOPHIL # BLD AUTO: 0.2 K/UL (ref 0–0.5)
EOSINOPHIL NFR BLD: 4.7 % (ref 0–8)
ERYTHROCYTE [DISTWIDTH] IN BLOOD BY AUTOMATED COUNT: 12.6 % (ref 11.5–14.5)
EST. GFR  (AFRICAN AMERICAN): >60 ML/MIN/1.73 M^2
EST. GFR  (NON AFRICAN AMERICAN): >60 ML/MIN/1.73 M^2
GLUCOSE SERPL-MCNC: 90 MG/DL (ref 70–110)
HCT VFR BLD AUTO: 45 % (ref 37–48.5)
HDLC SERPL-MCNC: 134 MG/DL (ref 40–75)
HDLC SERPL: 47.5 % (ref 20–50)
HGB BLD-MCNC: 15.1 G/DL (ref 12–16)
IMM GRANULOCYTES # BLD AUTO: 0.01 K/UL (ref 0–0.04)
IMM GRANULOCYTES NFR BLD AUTO: 0.3 % (ref 0–0.5)
LDLC SERPL CALC-MCNC: 138.6 MG/DL (ref 63–159)
LYMPHOCYTES # BLD AUTO: 1.3 K/UL (ref 1–4.8)
LYMPHOCYTES NFR BLD: 34.2 % (ref 18–48)
MCH RBC QN AUTO: 32.5 PG (ref 27–31)
MCHC RBC AUTO-ENTMCNC: 33.6 G/DL (ref 32–36)
MCV RBC AUTO: 97 FL (ref 82–98)
MONOCYTES # BLD AUTO: 0.4 K/UL (ref 0.3–1)
MONOCYTES NFR BLD: 10.1 % (ref 4–15)
NEUTROPHILS # BLD AUTO: 1.9 K/UL (ref 1.8–7.7)
NEUTROPHILS NFR BLD: 49.4 % (ref 38–73)
NONHDLC SERPL-MCNC: 148 MG/DL
NRBC BLD-RTO: 0 /100 WBC
PLATELET # BLD AUTO: 240 K/UL (ref 150–350)
PMV BLD AUTO: 11 FL (ref 9.2–12.9)
POTASSIUM SERPL-SCNC: 4.5 MMOL/L (ref 3.5–5.1)
PROT SERPL-MCNC: 7.7 G/DL (ref 6–8.4)
RBC # BLD AUTO: 4.65 M/UL (ref 4–5.4)
SODIUM SERPL-SCNC: 140 MMOL/L (ref 136–145)
T4 FREE SERPL-MCNC: 0.87 NG/DL (ref 0.71–1.51)
TRIGL SERPL-MCNC: 47 MG/DL (ref 30–150)
TSH SERPL DL<=0.005 MIU/L-ACNC: 1.04 UIU/ML (ref 0.4–4)
WBC # BLD AUTO: 3.86 K/UL (ref 3.9–12.7)

## 2020-06-18 PROCEDURE — 80061 LIPID PANEL: CPT

## 2020-06-18 PROCEDURE — 99396 PREV VISIT EST AGE 40-64: CPT | Mod: S$GLB,,, | Performed by: NURSE PRACTITIONER

## 2020-06-18 PROCEDURE — 99999 PR PBB SHADOW E&M-EST. PATIENT-LVL III: ICD-10-PCS | Mod: PBBFAC,,, | Performed by: NURSE PRACTITIONER

## 2020-06-18 PROCEDURE — 99396 PR PREVENTIVE VISIT,EST,40-64: ICD-10-PCS | Mod: S$GLB,,, | Performed by: NURSE PRACTITIONER

## 2020-06-18 PROCEDURE — 84443 ASSAY THYROID STIM HORMONE: CPT

## 2020-06-18 PROCEDURE — 80053 COMPREHEN METABOLIC PANEL: CPT

## 2020-06-18 PROCEDURE — 36415 COLL VENOUS BLD VENIPUNCTURE: CPT | Mod: PO

## 2020-06-18 PROCEDURE — 85025 COMPLETE CBC W/AUTO DIFF WBC: CPT

## 2020-06-18 PROCEDURE — 99999 PR PBB SHADOW E&M-EST. PATIENT-LVL III: CPT | Mod: PBBFAC,,, | Performed by: NURSE PRACTITIONER

## 2020-06-18 PROCEDURE — 84439 ASSAY OF FREE THYROXINE: CPT

## 2020-06-18 PROCEDURE — 86803 HEPATITIS C AB TEST: CPT

## 2020-06-18 PROCEDURE — 86703 HIV-1/HIV-2 1 RESULT ANTBDY: CPT

## 2020-06-18 NOTE — PROGRESS NOTES
History of Present Illness   Floridalma Campa is a 54 y.o. woman with medical history as listed below who presents today for routine medical exam. She monitors her diet and exercises daily. She is taking no daily medications. Complaint today: She reports chronic fatigue, hair loss, cold intolerance, and constipation; she would like to have her thyroid checked. She is followed by outside OBGYN and is up to date on her pap smear. She has had her mammogram done at [facility]. She is otherwise healthy and has no additional complaints and is otherwise healthy on today's visit.    Past Medical History:   Diagnosis Date    Bloating        Past Surgical History:   Procedure Laterality Date    COSMETIC SURGERY  1990,2005;2007       Social History     Socioeconomic History    Marital status:      Spouse name: Not on file    Number of children: Not on file    Years of education: Not on file    Highest education level: Not on file   Occupational History    Not on file   Social Needs    Financial resource strain: Not on file    Food insecurity     Worry: Not on file     Inability: Not on file    Transportation needs     Medical: Not on file     Non-medical: Not on file   Tobacco Use    Smoking status: Never Smoker   Substance and Sexual Activity    Alcohol use: Yes     Comment: daily wine, and liquor    Drug use: No    Sexual activity: Not on file   Lifestyle    Physical activity     Days per week: Not on file     Minutes per session: Not on file    Stress: Not on file   Relationships    Social connections     Talks on phone: Not on file     Gets together: Not on file     Attends Christianity service: Not on file     Active member of club or organization: Not on file     Attends meetings of clubs or organizations: Not on file     Relationship status: Not on file   Other Topics Concern    Not on file   Social History Narrative    Not on file       Family History   Problem Relation Age of Onset    Hypertension  "Mother     Heart disease Mother     Hypertension Father     Heart disease Father        Review of Systems  Review of Systems   Constitutional: Positive for malaise/fatigue and weight loss (patient reported).   HENT: Positive for hearing loss. Negative for tinnitus.    Eyes: Negative for blurred vision and double vision.   Respiratory: Negative for shortness of breath and wheezing.    Cardiovascular: Negative for chest pain, palpitations, orthopnea and leg swelling.   Gastrointestinal: Negative for blood in stool, heartburn and melena.   Genitourinary: Negative for flank pain and hematuria.   Musculoskeletal: Negative for back pain, falls and joint pain.   Skin: Negative for rash.   Neurological: Negative for dizziness, loss of consciousness and headaches.   Endo/Heme/Allergies: Negative for polydipsia.   Psychiatric/Behavioral: Negative for depression. The patient is not nervous/anxious and does not have insomnia.      A complete review of systems was otherwise negative.    Physical Exam  /84   Pulse 89   Temp 97.9 °F (36.6 °C) (Oral)   Ht 5' 4" (1.626 m)   Wt 49 kg (108 lb 0.4 oz)   LMP 08/01/2015   SpO2 98%   BMI 18.54 kg/m²   General appearance: alert, appears stated age, cooperative and no distress  Head: Normocephalic, without obvious abnormality, atraumatic  Eyes: conjunctivae/corneas clear. PERRL, EOM's intact. Fundi benign.  Ears: normal TM's and external ear canals both ears  Nose: Nares normal. Septum midline. Mucosa normal. No drainage or sinus tenderness.  Throat: lips, mucosa, and tongue normal; teeth and gums normal  Neck: no carotid bruit, no JVD, supple, symmetrical, trachea midline and thyroid not enlarged, symmetric, no tenderness/mass/nodules  Back: symmetric, no curvature. ROM normal. No CVA tenderness.  Lungs: clear to auscultation bilaterally  Heart: regular rate and rhythm, S1, S2 normal, no murmur, click, rub or gallop  Abdomen: soft, non-tender; bowel sounds normal; no " masses,  no organomegaly  Extremities: extremities normal, atraumatic, no cyanosis or edema  Pulses: 2+ and symmetric  Skin: Skin color, texture, turgor normal. No rashes or lesions  Lymph nodes: Cervical, supraclavicular, and axillary nodes normal.  Neurologic: Grossly normal    Assessment/Plan  Routine medical exam  Age appropriate screening recommendations and HM discussed and updated.  We will request most recent mammogram results.  Colonoscopy ordered.  Routine labs as below.  Discussed importance of heart healthy diet and exercise.  Return in 6 months to establish care with PCP.  -     CBC auto differential; Future; Expected date: 06/18/2020  -     Comprehensive metabolic panel; Future; Expected date: 06/18/2020  -     TSH; Future; Expected date: 06/18/2020  -     T4, free; Future; Expected date: 06/18/2020  -     Lipid Panel; Future; Expected date: 06/18/2020  -     Hepatitis C Antibody; Future; Expected date: 06/18/2020  -     HIV 1/2 Ag/Ab (4th Gen); Future; Expected date: 06/18/2020  -     Case request GI: COLONOSCOPY    Encounter for screening for HIV  Routine screening today.  -     HIV 1/2 Ag/Ab (4th Gen); Future; Expected date: 06/18/2020    Need for hepatitis C screening test  Routine screening today.  -     Hepatitis C Antibody; Future; Expected date: 06/18/2020    Screening for malignant neoplasm of colon  Ordered today.  -     Case request GI: COLONOSCOPY    Vasovagal syncope  Stable. Followed by cardiology.    Need for Tdap vaccination  Deferred, immunization not available in clinic.  -     (In Office Administered) Tdap Vaccine    Patient has verbalized understanding and is in agreement with plan of care.    Follow up in about 6 months (around 12/18/2020).

## 2020-06-19 ENCOUNTER — TELEPHONE (OUTPATIENT)
Dept: FAMILY MEDICINE | Facility: CLINIC | Age: 55
End: 2020-06-19

## 2020-06-19 LAB
HCV AB SERPL QL IA: NEGATIVE
HIV 1+2 AB+HIV1 P24 AG SERPL QL IA: NEGATIVE

## 2020-06-23 ENCOUNTER — TELEPHONE (OUTPATIENT)
Dept: FAMILY MEDICINE | Facility: CLINIC | Age: 55
End: 2020-06-23

## 2020-06-23 NOTE — TELEPHONE ENCOUNTER
Please let the patient know the following regarding her labs:    Blood counts, kidney function, liver function, thyroid function, electrolytes, and blood sugar are within normal limits. Her hep C screening and HIV screening are negative.    Her total cholesterol is elevated at 282, we want this under 200. Monitoring her diet and continuing routine exercise can help to manage this. No medication is needed at this time.    Thanks!  NOAH WhiteheadC

## 2020-12-23 ENCOUNTER — CLINICAL SUPPORT (OUTPATIENT)
Dept: URGENT CARE | Facility: CLINIC | Age: 55
End: 2020-12-23
Payer: COMMERCIAL

## 2020-12-23 DIAGNOSIS — Z11.9 SCREENING EXAMINATION FOR INFECTIOUS DISEASE: Primary | ICD-10-CM

## 2020-12-23 LAB
CTP QC/QA: YES
SARS-COV-2 RDRP RESP QL NAA+PROBE: NEGATIVE

## 2020-12-23 PROCEDURE — U0002: ICD-10-PCS | Mod: QW,S$GLB,, | Performed by: NURSE PRACTITIONER

## 2020-12-23 PROCEDURE — U0002 COVID-19 LAB TEST NON-CDC: HCPCS | Mod: QW,S$GLB,, | Performed by: NURSE PRACTITIONER

## 2020-12-23 NOTE — LETTER
"     [address], [address] [place] [address]  Phone: [phone]  Fax: [phone]          Return to Work/School    Patient: Floridalma Campa  YOB: 1965   Date: 12/23/2020     To Whom It May Concern:     Floridalma Campa was in contact with/seen in my office on 12/23/2020. COVID-19 is present in our communities across the state. There is limited testing for COVID at this time, so not all patients can be tested. In this situation, your employee meets the following criteria:     NEGATIVE COVID TEST  -You have tested negative for COVID-19 today.  If you did not have a close exposure (as defined below) you can return to your normal daily activities to include social distancing, wearing a mask and frequent handwashing.  -A "close exposure" is defined as anyone who has had an exposure (masked or unmasked) to a known COVID -19 positive person within 6 ft for longer than 15 minutes. If your exposure meets this definition, you are required by CDC guidelines to quarantine for at least 7-10 days from time of exposure.  -The CDC states that a test can be performed for an asymptomatic patient (someone who does not have any symptoms) after a close exposure, and that a test should be done if you develop symptoms after a close exposure as described above.  -Specifically, you can test at day 5 or later if asymptomatic in order to get released from quarantine on day 7 or later.  If you develop symptoms sooner, you should test when your symptoms start.  -If you developed symptoms since the exposure, and your test was negative today and less than 5 days from your exposure, you still have to quarantine for 7-10 days from the date of the exposure.  -The 7-10 day quarantine begins from the day you were exposed, not the day of your test.  For example, if your exposure was on a Monday, and you waited until Friday of the same week to get tested and it was negative, your 7-10 day quarantine begins from that Monday, " not the Friday you tested negative.  -Please note, if you decide to test as an asymptomatic during your quarantine and you are positive, you will be restarting your quarantine and moving from a possible 10 day quarantine (if you do not test), to a 11 day or greater quarantine.       If you have any questions or concerns, or if I can be of further assistance, please do not hesitate to contact me.     Sincerely,    N[facility], RHONDA